# Patient Record
Sex: OTHER/UNKNOWN | Race: WHITE | NOT HISPANIC OR LATINO | URBAN - METROPOLITAN AREA
[De-identification: names, ages, dates, MRNs, and addresses within clinical notes are randomized per-mention and may not be internally consistent; named-entity substitution may affect disease eponyms.]

---

## 2018-08-16 ENCOUNTER — APPOINTMENT (OUTPATIENT)
Age: 33
Setting detail: DERMATOLOGY
End: 2018-08-20

## 2018-08-16 VITALS
DIASTOLIC BLOOD PRESSURE: 72 MMHG | WEIGHT: 221 LBS | HEART RATE: 79 BPM | HEIGHT: 69 IN | SYSTOLIC BLOOD PRESSURE: 114 MMHG

## 2018-08-16 DIAGNOSIS — D22 MELANOCYTIC NEVI: ICD-10-CM

## 2018-08-16 PROBLEM — D22.71 MELANOCYTIC NEVI OF RIGHT LOWER LIMB, INCLUDING HIP: Status: ACTIVE | Noted: 2018-08-16

## 2018-08-16 PROBLEM — L85.3 XEROSIS CUTIS: Status: ACTIVE | Noted: 2018-08-16

## 2018-08-16 PROBLEM — D48.5 NEOPLASM OF UNCERTAIN BEHAVIOR OF SKIN: Status: ACTIVE | Noted: 2018-08-16

## 2018-08-16 PROCEDURE — 11100: CPT

## 2018-08-16 PROCEDURE — OTHER BIOPSY BY SHAVE METHOD: OTHER

## 2018-08-16 PROCEDURE — OTHER MIPS QUALITY: OTHER

## 2018-08-16 PROCEDURE — OTHER COUNSELING: OTHER

## 2018-08-16 ASSESSMENT — LOCATION SIMPLE DESCRIPTION DERM: LOCATION SIMPLE: RIGHT THIGH

## 2018-08-16 ASSESSMENT — LOCATION ZONE DERM: LOCATION ZONE: LEG

## 2018-08-16 ASSESSMENT — LOCATION DETAILED DESCRIPTION DERM: LOCATION DETAILED: RIGHT ANTERIOR DISTAL THIGH

## 2018-08-16 NOTE — PROCEDURE: BIOPSY BY SHAVE METHOD
Curettage Text: The wound bed was treated with curettage after the biopsy was performed.
Was A Bandage Applied: Yes
Electrodesiccation And Curettage Text: The wound bed was treated with electrodesiccation and curettage after the biopsy was performed.
Size Of Lesion In Cm: 0
Wound Care: Bacitracin
Render Post-Care Instructions In Note?: no
Electrodesiccation Text: The wound bed was treated with electrodesiccation after the biopsy was performed.
Hemostasis: Pressure
Depth Of Biopsy: dermis
Anesthesia Type: 1% lidocaine with epinephrine
Consent: Written consent was obtained and risks were reviewed including but not limited to scarring, infection, bleeding, scabbing, incomplete removal, nerve damage and allergy to anesthesia.
Biopsy Type: H and E
Cryotherapy Text: The wound bed was treated with cryotherapy after the biopsy was performed.
Post-Care Instructions: I reviewed with the patient in detail post-care instructions. Patient is to keep the biopsy site dry overnight, and then apply bacitracin twice daily until healed. Patient may apply hydrogen peroxide soaks to remove any crusting.
Notification Instructions: Patient will be notified of biopsy results. However, patient instructed to call the office if not contacted within 2 weeks.
Detail Level: Detailed
Billing Type: Third-Party Bill
Silver Nitrate Text: The wound bed was treated with silver nitrate after the biopsy was performed.
Dressing: bandage
Type Of Destruction Used: Curettage
Biopsy Method: Dermablade
Anesthesia Volume In Cc (Will Not Render If 0): 0.5

## 2018-12-04 ENCOUNTER — APPOINTMENT (OUTPATIENT)
Age: 33
Setting detail: DERMATOLOGY
End: 2018-12-04

## 2018-12-04 VITALS
HEIGHT: 69 IN | HEART RATE: 57 BPM | WEIGHT: 220 LBS | SYSTOLIC BLOOD PRESSURE: 123 MMHG | DIASTOLIC BLOOD PRESSURE: 79 MMHG

## 2018-12-04 DIAGNOSIS — D22 MELANOCYTIC NEVI: ICD-10-CM

## 2018-12-04 PROBLEM — E13.9 OTHER SPECIFIED DIABETES MELLITUS WITHOUT COMPLICATIONS: Status: ACTIVE | Noted: 2018-12-04

## 2018-12-04 PROBLEM — D22.71 MELANOCYTIC NEVI OF RIGHT LOWER LIMB, INCLUDING HIP: Status: ACTIVE | Noted: 2018-12-04

## 2018-12-04 PROCEDURE — OTHER MIPS QUALITY: OTHER

## 2018-12-04 PROCEDURE — OTHER DISCHARGE ORDERS: OTHER

## 2018-12-04 PROCEDURE — 13121 CMPLX RPR S/A/L 2.6-7.5 CM: CPT

## 2018-12-04 PROCEDURE — 11403 EXC TR-EXT B9+MARG 2.1-3CM: CPT

## 2018-12-04 PROCEDURE — OTHER EXCISION: OTHER

## 2018-12-04 ASSESSMENT — LOCATION DETAILED DESCRIPTION DERM: LOCATION DETAILED: RIGHT ANTERIOR DISTAL THIGH

## 2018-12-04 ASSESSMENT — LOCATION SIMPLE DESCRIPTION DERM: LOCATION SIMPLE: RIGHT THIGH

## 2018-12-04 ASSESSMENT — LOCATION ZONE DERM: LOCATION ZONE: LEG

## 2018-12-04 NOTE — PROCEDURE: MIPS QUALITY
Quality 130: Documentation Of Current Medications In The Medical Record: Current Medications Documented
Quality 265: Biopsy Follow-Up: Biopsy results reviewed, communicated, tracked, and documented
Detail Level: Detailed
Quality 226: Preventive Care And Screening: Tobacco Use: Screening And Cessation Intervention: Patient screened for tobacco use and is an ex/non-smoker
Quality 110: Preventive Care And Screening: Influenza Immunization: Influenza Immunization Administered during Influenza season

## 2018-12-04 NOTE — PROCEDURE: EXCISION
Graft Donor Site Bandage (Optional-Leave Blank If You Don't Want In Note): A pressure bandage was applied to the surgery site.

## 2018-12-04 NOTE — PROCEDURE: EXCISION
aching Advancement Flap (Double) Text: The defect edges were debeveled with a #15 scalpel blade.  Given the location of the defect and the proximity to free margins a double advancement flap was deemed most appropriate.  Using a sterile surgical marker, the appropriate advancement flaps were drawn incorporating the defect and placing the expected incisions within the relaxed skin tension lines where possible.    The area thus outlined was incised deep to adipose tissue with a #15 scalpel blade.  The skin margins were undermined to an appropriate distance in all directions utilizing iris scissors.

## 2018-12-04 NOTE — PROCEDURE: EXCISION
Medical Necessity Clause: This procedure was medically necessary because the lesion that was treated was: spitz nevus